# Patient Record
Sex: MALE | HISPANIC OR LATINO | Employment: PART TIME | URBAN - METROPOLITAN AREA
[De-identification: names, ages, dates, MRNs, and addresses within clinical notes are randomized per-mention and may not be internally consistent; named-entity substitution may affect disease eponyms.]

---

## 2021-12-24 ENCOUNTER — HOSPITAL ENCOUNTER (EMERGENCY)
Facility: MEDICAL CENTER | Age: 26
End: 2021-12-24
Attending: EMERGENCY MEDICINE
Payer: MEDICAID

## 2021-12-24 VITALS
WEIGHT: 130 LBS | RESPIRATION RATE: 12 BRPM | HEIGHT: 68 IN | DIASTOLIC BLOOD PRESSURE: 78 MMHG | TEMPERATURE: 97.5 F | OXYGEN SATURATION: 98 % | BODY MASS INDEX: 19.7 KG/M2 | HEART RATE: 96 BPM | SYSTOLIC BLOOD PRESSURE: 121 MMHG

## 2021-12-24 DIAGNOSIS — T50.901A ACCIDENTAL DRUG OVERDOSE, INITIAL ENCOUNTER: ICD-10-CM

## 2021-12-24 PROCEDURE — 99285 EMERGENCY DEPT VISIT HI MDM: CPT

## 2021-12-24 PROCEDURE — A9270 NON-COVERED ITEM OR SERVICE: HCPCS | Performed by: EMERGENCY MEDICINE

## 2021-12-24 PROCEDURE — 700102 HCHG RX REV CODE 250 W/ 637 OVERRIDE(OP): Performed by: EMERGENCY MEDICINE

## 2021-12-24 PROCEDURE — 700111 HCHG RX REV CODE 636 W/ 250 OVERRIDE (IP): Performed by: EMERGENCY MEDICINE

## 2021-12-24 RX ORDER — ONDANSETRON 4 MG/1
4 TABLET, ORALLY DISINTEGRATING ORAL EVERY 6 HOURS PRN
Qty: 10 TABLET | Refills: 0 | Status: SHIPPED | OUTPATIENT
Start: 2021-12-24 | End: 2021-12-29

## 2021-12-24 RX ORDER — METOCLOPRAMIDE 10 MG/1
10 TABLET ORAL ONCE
Status: COMPLETED | OUTPATIENT
Start: 2021-12-24 | End: 2021-12-24

## 2021-12-24 RX ORDER — ONDANSETRON 4 MG/1
4 TABLET, ORALLY DISINTEGRATING ORAL ONCE
Status: COMPLETED | OUTPATIENT
Start: 2021-12-24 | End: 2021-12-24

## 2021-12-24 RX ADMIN — ONDANSETRON 4 MG: 4 TABLET, ORALLY DISINTEGRATING ORAL at 19:49

## 2021-12-24 RX ADMIN — METOCLOPRAMIDE 10 MG: 10 TABLET ORAL at 20:18

## 2021-12-25 NOTE — ED PROVIDER NOTES
"ER Provider Note     Scribed for Jas Mantilla M.D. by Julissa Ramirez. 12/24/2021, 6:25 PM.    Primary Care Provider: Kenan Blake D.O.  Means of Arrival: Ambulance    History obtained from: Patient  History limited by: None     CHIEF COMPLAINT  Chief Complaint   Patient presents with    Drug Overdose       HPI  Corbin Jamil is a 26 y.o. male who presents to the Emergency Department via EMS for drug overdose. The patient notes that he took a friend's percocet \"to feel good\" because he has been feeling an increased amount of stress lately. He endorses that this is the second time he has taken drugs. Corbin was found unresponsive by his family. They conducted CPR for 5-7 minutes until EMS arrived. En route to the hospital, EMS administered 1 mg of narcan twice.     REVIEW OF SYSTEMS  See HPI for further details.   Pertinent positives include drug overdose. Pertinent negatives include no fever. All other systems negative.       PAST MEDICAL HISTORY  None.     SURGICAL HISTORY  patient denies any surgical history    SOCIAL HISTORY  Social History     Tobacco Use    Smoking status: Never Smoker    Smokeless tobacco: None noted   Substance Use Topics    Alcohol use: No    Drug use: No      Social History     Substance and Sexual Activity   Drug Use No       FAMILY HISTORY  Family History   Problem Relation Age of Onset    Heart Disease Maternal Grandfather     Diabetes Neg Hx     Stroke Neg Hx     Hypertension Neg Hx     Cancer Neg Hx        CURRENT MEDICATIONS    Current Outpatient Medications:     hydrocodone-acetaminophen (NORCO) 5-325 MG TABS per tablet, Take 1-2 Tabs by mouth every four hours as needed., Disp: 10 Tab, Rfl: 0    ALLERGIES  No Known Allergies    PHYSICAL EXAM  VITAL SIGNS: /74   Pulse 92   Temp 36.7 °C (98 °F) (Temporal)   Resp 12   Ht 1.727 m (5' 8\")   Wt 59 kg (130 lb)   SpO2 99%   BMI 19.77 kg/m²      Constitutional: Alert in no apparent distress.  HENT: No signs of trauma, " Bilateral external ears normal, Nose normal.   Eyes: Pupils are equal and reactive, Conjunctiva normal, Non-icteric.   Neck: Normal range of motion, No tenderness, Supple, No stridor.   Lymphatic: No lymphadenopathy noted.   Cardiovascular: Regular rate and rhythm, no murmurs.   Thorax & Lungs: Normal breath sounds, No respiratory distress, No wheezing, No chest tenderness.   Abdomen: Bowel sounds normal, Soft, No tenderness, No masses, No pulsatile masses. No peritoneal signs.  Skin: Warm, Dry, No erythema, No rash.   Back: No bony tenderness, No CVA tenderness.   Extremities: Intact distal pulses, No edema, No tenderness, No cyanosis.  Musculoskeletal: Good range of motion in all major joints. No tenderness to palpation or major deformities noted.   Neurologic: Alert , Normal motor function, Normal sensory function, No focal deficits noted.   Psychiatric: Affect normal, Judgment normal, Mood normal.         DIAGNOSTIC STUDIES / PROCEDURES    EKG Interpretation:  Interpreted by me  12 Lead EKG interpreted by me to show:  Normal sinus rhythm  Rate 91  Axis: Normal  Intervals: Normal  Normal T waves. No T wave inversion.   No ST depression. ST elevation which appears to be benign early repolarization.   My impression of this EKG: Does not indicate ischemia or arrhythmia at this time.       COURSE & MEDICAL DECISION MAKING  Pertinent Labs & Imaging studies reviewed. (See chart for details)    This is a 26 y.o. male that presents after a overdose on likely fentanyl that was told to be Percocet.  Patient was given Narcan and woke up.  The patient had CPR performed prior to this.  At this time the patient is nauseous and will give Zofran.  We will observe the patient for over 1 hour to assure the half-life of Narcan is longer than the underlying drug.  We did discuss drug use and I counseled him..     6:25 PM - Patient seen and examined at bedside. I spoke to the patient about the dangers of taking pills and using drugs.  I advised him to discontinue.     7:48 PM - Ordered Zofran 4 mg.        The patient will return for new or worsening symptoms and is stable at the time of discharge.    The patient is referred to a primary physician for blood pressure management, diabetic screening, and for all other preventative health concerns.    The patient has an unremarkable EKG.  Other labs are not indicated at this time.  He is feeling improved and continues to be breathing normally with no recurrence of opiate toxicity.  Zofran helps his nausea and will discharge him home with this.  He was given psychiatric resources as well.      DISPOSITION:  Patient will be discharged home in stable condition.    FOLLOW UP:  Kenan Blake D.O.  [address] [facility] [address]  [phone]        OUTPATIENT MEDICATIONS:  Discharge Medication List as of 12/24/2021  9:05 PM        START taking these medications    Details   ondansetron (ZOFRAN ODT) 4 MG TABLET DISPERSIBLE Take 1 Tablet by mouth every 6 hours as needed for Nausea for up to 5 days., Disp-10 Tablet, R-0, Normal               FINAL IMPRESSION  1. Accidental drug overdose, initial encounter          Julissa URENA (Kandy), am scribing for, and in the presence of, Jsa Mantilla M.D..    Electronically signed by: Julissa Ramirez (Kandy), 12/24/2021    Jas URENA M.D. personally performed the services described in this documentation, as scribed by Julissa Ramirez in my presence, and it is both accurate and complete.     The note accurately reflects work and decisions made by me.  Jas Mantilla M.D.  12/24/2021  11:07 PM

## 2021-12-25 NOTE — ED NOTES
Patient placed back on 2L of oxygen NC, when patient is sleeping, pt desat to 86% on room air. Patient is now >90% on 2L NC.

## 2021-12-25 NOTE — ED NOTES
"Pt discharged home with his sister. Pt is A/O x 4, ambulatory with a steady gait. Pt awake and talking and tolerating room air. IV discontinued and gauze placed, pt in possession of belongings. Pt provided discharge education and information pertaining to medications and follow up appointments. Discussed signs and symptoms to return to the ER, patient verbalized understanding. Pt received copy of discharge instructions and verbalized understanding.     /78   Pulse 96   Temp 36.4 °C (97.5 °F) (Temporal)   Resp 12   Ht 1.727 m (5' 8\")   Wt 59 kg (130 lb)   SpO2 98%   BMI 19.77 kg/m²    "

## 2021-12-25 NOTE — ED TRIAGE NOTES
"Vitals:    12/24/21 1814   BP: 119/74   Pulse: 92   Resp: 12   Temp: 36.7 °C (98 °F)   SpO2: 99%     Chief Complaint   Patient presents with   • Drug Overdose     Pt was found by his family unresponsive. They did 5-7 mnutes of CPR until EMS arrived. Pt was immediately given narcan IM, and IV was established and another 1 mg narcan was given intravenously. Pt woke and stated that he had taken a friends percocet \"to feel good.\" Pt denies suicide attempt again here in the ED. Pt was initially in Afib RVR on scene but with fluids and oxygen en route has returned to a NSR. Pt is arousable but sleepy at this time, and is alert and oriented x 4. Pt has apparently been seen at [facility] in the past, takes prozac and zyprexa.   "

## 2025-05-23 ENCOUNTER — ANESTHESIA (OUTPATIENT)
Dept: SURGERY | Facility: MEDICAL CENTER | Age: 30
End: 2025-05-23
Payer: COMMERCIAL

## 2025-05-23 ENCOUNTER — APPOINTMENT (OUTPATIENT)
Dept: RADIOLOGY | Facility: MEDICAL CENTER | Age: 30
End: 2025-05-23
Attending: EMERGENCY MEDICINE
Payer: COMMERCIAL

## 2025-05-23 ENCOUNTER — HOSPITAL ENCOUNTER (EMERGENCY)
Facility: MEDICAL CENTER | Age: 30
End: 2025-05-23
Attending: EMERGENCY MEDICINE
Payer: COMMERCIAL

## 2025-05-23 ENCOUNTER — ANESTHESIA EVENT (OUTPATIENT)
Dept: SURGERY | Facility: MEDICAL CENTER | Age: 30
End: 2025-05-23
Payer: COMMERCIAL

## 2025-05-23 VITALS
TEMPERATURE: 98.8 F | BODY MASS INDEX: 23.67 KG/M2 | HEART RATE: 78 BPM | RESPIRATION RATE: 16 BRPM | WEIGHT: 150.79 LBS | HEIGHT: 67 IN | OXYGEN SATURATION: 97 % | SYSTOLIC BLOOD PRESSURE: 109 MMHG | DIASTOLIC BLOOD PRESSURE: 72 MMHG

## 2025-05-23 DIAGNOSIS — K35.80 ACUTE APPENDICITIS, UNSPECIFIED ACUTE APPENDICITIS TYPE: ICD-10-CM

## 2025-05-23 DIAGNOSIS — K35.30 ACUTE APPENDICITIS WITH LOCALIZED PERITONITIS, UNSPECIFIED WHETHER ABSCESS PRESENT, UNSPECIFIED WHETHER GANGRENE PRESENT, UNSPECIFIED WHETHER PERFORATION PRESENT: Primary | ICD-10-CM

## 2025-05-23 LAB
ALBUMIN SERPL BCP-MCNC: 4.8 G/DL (ref 3.2–4.9)
ALBUMIN/GLOB SERPL: 1.8 G/DL
ALP SERPL-CCNC: 132 U/L (ref 30–99)
ALT SERPL-CCNC: 98 U/L (ref 2–50)
ANION GAP SERPL CALC-SCNC: 15 MMOL/L (ref 7–16)
AST SERPL-CCNC: 43 U/L (ref 12–45)
BASOPHILS # BLD AUTO: 0.2 % (ref 0–1.8)
BASOPHILS # BLD: 0.03 K/UL (ref 0–0.12)
BILIRUB SERPL-MCNC: 0.8 MG/DL (ref 0.1–1.5)
BUN SERPL-MCNC: 19 MG/DL (ref 8–22)
CALCIUM ALBUM COR SERPL-MCNC: 9 MG/DL (ref 8.5–10.5)
CALCIUM SERPL-MCNC: 9.6 MG/DL (ref 8.5–10.5)
CHLORIDE SERPL-SCNC: 103 MMOL/L (ref 96–112)
CO2 SERPL-SCNC: 21 MMOL/L (ref 20–33)
CREAT SERPL-MCNC: 0.88 MG/DL (ref 0.5–1.4)
EOSINOPHIL # BLD AUTO: 0 K/UL (ref 0–0.51)
EOSINOPHIL NFR BLD: 0 % (ref 0–6.9)
ERYTHROCYTE [DISTWIDTH] IN BLOOD BY AUTOMATED COUNT: 36.9 FL (ref 35.9–50)
GFR SERPLBLD CREATININE-BSD FMLA CKD-EPI: 119 ML/MIN/1.73 M 2
GLOBULIN SER CALC-MCNC: 2.7 G/DL (ref 1.9–3.5)
GLUCOSE SERPL-MCNC: 116 MG/DL (ref 65–99)
HCT VFR BLD AUTO: 48.9 % (ref 42–52)
HGB BLD-MCNC: 17.3 G/DL (ref 14–18)
IMM GRANULOCYTES # BLD AUTO: 0.11 K/UL (ref 0–0.11)
IMM GRANULOCYTES NFR BLD AUTO: 0.6 % (ref 0–0.9)
LIPASE SERPL-CCNC: 15 U/L (ref 11–82)
LYMPHOCYTES # BLD AUTO: 0.56 K/UL (ref 1–4.8)
LYMPHOCYTES NFR BLD: 3.1 % (ref 22–41)
MCH RBC QN AUTO: 31.1 PG (ref 27–33)
MCHC RBC AUTO-ENTMCNC: 35.4 G/DL (ref 32.3–36.5)
MCV RBC AUTO: 87.8 FL (ref 81.4–97.8)
MONOCYTES # BLD AUTO: 1.3 K/UL (ref 0–0.85)
MONOCYTES NFR BLD AUTO: 7.1 % (ref 0–13.4)
NEUTROPHILS # BLD AUTO: 16.3 K/UL (ref 1.82–7.42)
NEUTROPHILS NFR BLD: 89 % (ref 44–72)
NRBC # BLD AUTO: 0 K/UL
NRBC BLD-RTO: 0 /100 WBC (ref 0–0.2)
PATHOLOGY CONSULT NOTE: NORMAL
PLATELET # BLD AUTO: 143 K/UL (ref 164–446)
PMV BLD AUTO: 11.4 FL (ref 9–12.9)
POTASSIUM SERPL-SCNC: 3.9 MMOL/L (ref 3.6–5.5)
PROT SERPL-MCNC: 7.5 G/DL (ref 6–8.2)
RBC # BLD AUTO: 5.57 M/UL (ref 4.7–6.1)
SODIUM SERPL-SCNC: 139 MMOL/L (ref 135–145)
WBC # BLD AUTO: 18.3 K/UL (ref 4.8–10.8)

## 2025-05-23 PROCEDURE — 700105 HCHG RX REV CODE 258: Performed by: EMERGENCY MEDICINE

## 2025-05-23 PROCEDURE — 88304 TISSUE EXAM BY PATHOLOGIST: CPT | Mod: 26 | Performed by: PATHOLOGY

## 2025-05-23 PROCEDURE — 85025 COMPLETE CBC W/AUTO DIFF WBC: CPT

## 2025-05-23 PROCEDURE — 160048 HCHG OR STATISTICAL LEVEL 1-5: Performed by: SURGERY

## 2025-05-23 PROCEDURE — 74177 CT ABD & PELVIS W/CONTRAST: CPT

## 2025-05-23 PROCEDURE — 160046 HCHG PACU - 1ST 60 MINS PHASE II: Performed by: SURGERY

## 2025-05-23 PROCEDURE — 160029 HCHG SURGERY MINUTES - 1ST 30 MINS LEVEL 4: Performed by: SURGERY

## 2025-05-23 PROCEDURE — 700101 HCHG RX REV CODE 250: Performed by: SURGERY

## 2025-05-23 PROCEDURE — 99291 CRITICAL CARE FIRST HOUR: CPT

## 2025-05-23 PROCEDURE — 700111 HCHG RX REV CODE 636 W/ 250 OVERRIDE (IP): Performed by: ANESTHESIOLOGY

## 2025-05-23 PROCEDURE — 160025 RECOVERY II MINUTES (STATS): Performed by: SURGERY

## 2025-05-23 PROCEDURE — 160015 HCHG STAT PREOP MINUTES: Performed by: SURGERY

## 2025-05-23 PROCEDURE — 160041 HCHG SURGERY MINUTES - EA ADDL 1 MIN LEVEL 4: Performed by: SURGERY

## 2025-05-23 PROCEDURE — 700117 HCHG RX CONTRAST REV CODE 255: Performed by: EMERGENCY MEDICINE

## 2025-05-23 PROCEDURE — 83690 ASSAY OF LIPASE: CPT

## 2025-05-23 PROCEDURE — 700111 HCHG RX REV CODE 636 W/ 250 OVERRIDE (IP): Performed by: EMERGENCY MEDICINE

## 2025-05-23 PROCEDURE — 80053 COMPREHEN METABOLIC PANEL: CPT

## 2025-05-23 PROCEDURE — 96365 THER/PROPH/DIAG IV INF INIT: CPT | Mod: XU

## 2025-05-23 PROCEDURE — 700105 HCHG RX REV CODE 258: Performed by: SURGERY

## 2025-05-23 PROCEDURE — 96375 TX/PRO/DX INJ NEW DRUG ADDON: CPT | Mod: XU

## 2025-05-23 PROCEDURE — 700101 HCHG RX REV CODE 250: Performed by: ANESTHESIOLOGY

## 2025-05-23 PROCEDURE — 160035 HCHG PACU - 1ST 60 MINS PHASE I: Performed by: SURGERY

## 2025-05-23 PROCEDURE — 88304 TISSUE EXAM BY PATHOLOGIST: CPT | Performed by: PATHOLOGY

## 2025-05-23 PROCEDURE — 36415 COLL VENOUS BLD VENIPUNCTURE: CPT

## 2025-05-23 PROCEDURE — 160002 HCHG RECOVERY MINUTES (STAT): Performed by: SURGERY

## 2025-05-23 PROCEDURE — 160009 HCHG ANES TIME/MIN: Performed by: SURGERY

## 2025-05-23 RX ORDER — DEXAMETHASONE SODIUM PHOSPHATE 4 MG/ML
INJECTION, SOLUTION INTRA-ARTICULAR; INTRALESIONAL; INTRAMUSCULAR; INTRAVENOUS; SOFT TISSUE PRN
Status: DISCONTINUED | OUTPATIENT
Start: 2025-05-23 | End: 2025-05-23 | Stop reason: SURG

## 2025-05-23 RX ORDER — LABETALOL HYDROCHLORIDE 5 MG/ML
5 INJECTION, SOLUTION INTRAVENOUS
Status: DISCONTINUED | OUTPATIENT
Start: 2025-05-23 | End: 2025-05-23 | Stop reason: HOSPADM

## 2025-05-23 RX ORDER — MORPHINE SULFATE 4 MG/ML
4 INJECTION INTRAVENOUS ONCE
Status: COMPLETED | OUTPATIENT
Start: 2025-05-23 | End: 2025-05-23

## 2025-05-23 RX ORDER — ALBUTEROL SULFATE 5 MG/ML
2.5 SOLUTION RESPIRATORY (INHALATION)
Status: DISCONTINUED | OUTPATIENT
Start: 2025-05-23 | End: 2025-05-23 | Stop reason: HOSPADM

## 2025-05-23 RX ORDER — SODIUM CHLORIDE, SODIUM LACTATE, POTASSIUM CHLORIDE, CALCIUM CHLORIDE 600; 310; 30; 20 MG/100ML; MG/100ML; MG/100ML; MG/100ML
INJECTION, SOLUTION INTRAVENOUS CONTINUOUS
Status: DISCONTINUED | OUTPATIENT
Start: 2025-05-23 | End: 2025-05-23 | Stop reason: HOSPADM

## 2025-05-23 RX ORDER — SODIUM CHLORIDE 9 MG/ML
1000 INJECTION, SOLUTION INTRAVENOUS ONCE
Status: COMPLETED | OUTPATIENT
Start: 2025-05-23 | End: 2025-05-23

## 2025-05-23 RX ORDER — ONDANSETRON 2 MG/ML
INJECTION INTRAMUSCULAR; INTRAVENOUS PRN
Status: DISCONTINUED | OUTPATIENT
Start: 2025-05-23 | End: 2025-05-23 | Stop reason: SURG

## 2025-05-23 RX ORDER — ONDANSETRON 2 MG/ML
4 INJECTION INTRAMUSCULAR; INTRAVENOUS ONCE
Status: COMPLETED | OUTPATIENT
Start: 2025-05-23 | End: 2025-05-23

## 2025-05-23 RX ORDER — BUPIVACAINE HYDROCHLORIDE AND EPINEPHRINE 5; 5 MG/ML; UG/ML
INJECTION, SOLUTION PERINEURAL
Status: DISCONTINUED | OUTPATIENT
Start: 2025-05-23 | End: 2025-05-23 | Stop reason: HOSPADM

## 2025-05-23 RX ORDER — HYDROMORPHONE HYDROCHLORIDE 1 MG/ML
0.1 INJECTION, SOLUTION INTRAMUSCULAR; INTRAVENOUS; SUBCUTANEOUS
Status: DISCONTINUED | OUTPATIENT
Start: 2025-05-23 | End: 2025-05-23 | Stop reason: HOSPADM

## 2025-05-23 RX ORDER — HYDROMORPHONE HYDROCHLORIDE 1 MG/ML
0.4 INJECTION, SOLUTION INTRAMUSCULAR; INTRAVENOUS; SUBCUTANEOUS
Status: DISCONTINUED | OUTPATIENT
Start: 2025-05-23 | End: 2025-05-23 | Stop reason: HOSPADM

## 2025-05-23 RX ORDER — OXYCODONE AND ACETAMINOPHEN 10; 325 MG/1; MG/1
1 TABLET ORAL EVERY 4 HOURS PRN
Qty: 15 TABLET | Refills: 0 | Status: SHIPPED | OUTPATIENT
Start: 2025-05-23 | End: 2025-05-30

## 2025-05-23 RX ORDER — OXYCODONE HCL 5 MG/5 ML
10 SOLUTION, ORAL ORAL
Status: DISCONTINUED | OUTPATIENT
Start: 2025-05-23 | End: 2025-05-23 | Stop reason: HOSPADM

## 2025-05-23 RX ORDER — HYDRALAZINE HYDROCHLORIDE 20 MG/ML
5 INJECTION INTRAMUSCULAR; INTRAVENOUS
Status: DISCONTINUED | OUTPATIENT
Start: 2025-05-23 | End: 2025-05-23 | Stop reason: HOSPADM

## 2025-05-23 RX ORDER — ONDANSETRON 2 MG/ML
4 INJECTION INTRAMUSCULAR; INTRAVENOUS
Status: DISCONTINUED | OUTPATIENT
Start: 2025-05-23 | End: 2025-05-23 | Stop reason: HOSPADM

## 2025-05-23 RX ORDER — HYDROMORPHONE HYDROCHLORIDE 1 MG/ML
0.2 INJECTION, SOLUTION INTRAMUSCULAR; INTRAVENOUS; SUBCUTANEOUS
Status: DISCONTINUED | OUTPATIENT
Start: 2025-05-23 | End: 2025-05-23 | Stop reason: HOSPADM

## 2025-05-23 RX ORDER — HALOPERIDOL 5 MG/ML
1 INJECTION INTRAMUSCULAR
Status: DISCONTINUED | OUTPATIENT
Start: 2025-05-23 | End: 2025-05-23 | Stop reason: HOSPADM

## 2025-05-23 RX ORDER — KETOROLAC TROMETHAMINE 15 MG/ML
INJECTION, SOLUTION INTRAMUSCULAR; INTRAVENOUS PRN
Status: DISCONTINUED | OUTPATIENT
Start: 2025-05-23 | End: 2025-05-23 | Stop reason: SURG

## 2025-05-23 RX ORDER — SUCCINYLCHOLINE CHLORIDE 20 MG/ML
INJECTION INTRAMUSCULAR; INTRAVENOUS PRN
Status: DISCONTINUED | OUTPATIENT
Start: 2025-05-23 | End: 2025-05-23 | Stop reason: SURG

## 2025-05-23 RX ORDER — CEFTRIAXONE 2 G/1
2000 INJECTION, POWDER, FOR SOLUTION INTRAMUSCULAR; INTRAVENOUS ONCE
Status: COMPLETED | OUTPATIENT
Start: 2025-05-23 | End: 2025-05-23

## 2025-05-23 RX ORDER — ROCURONIUM BROMIDE 10 MG/ML
INJECTION, SOLUTION INTRAVENOUS PRN
Status: DISCONTINUED | OUTPATIENT
Start: 2025-05-23 | End: 2025-05-23 | Stop reason: SURG

## 2025-05-23 RX ORDER — MEPERIDINE HYDROCHLORIDE 25 MG/ML
6.25 INJECTION INTRAMUSCULAR; INTRAVENOUS; SUBCUTANEOUS
Status: DISCONTINUED | OUTPATIENT
Start: 2025-05-23 | End: 2025-05-23 | Stop reason: HOSPADM

## 2025-05-23 RX ORDER — METOCLOPRAMIDE HYDROCHLORIDE 5 MG/ML
INJECTION INTRAMUSCULAR; INTRAVENOUS PRN
Status: DISCONTINUED | OUTPATIENT
Start: 2025-05-23 | End: 2025-05-23 | Stop reason: SURG

## 2025-05-23 RX ORDER — EPHEDRINE SULFATE 50 MG/ML
5 INJECTION, SOLUTION INTRAVENOUS
Status: DISCONTINUED | OUTPATIENT
Start: 2025-05-23 | End: 2025-05-23 | Stop reason: HOSPADM

## 2025-05-23 RX ORDER — METRONIDAZOLE 500 MG/100ML
500 INJECTION, SOLUTION INTRAVENOUS ONCE
Status: COMPLETED | OUTPATIENT
Start: 2025-05-23 | End: 2025-05-23

## 2025-05-23 RX ORDER — DIPHENHYDRAMINE HYDROCHLORIDE 50 MG/ML
12.5 INJECTION, SOLUTION INTRAMUSCULAR; INTRAVENOUS
Status: DISCONTINUED | OUTPATIENT
Start: 2025-05-23 | End: 2025-05-23 | Stop reason: HOSPADM

## 2025-05-23 RX ORDER — LIDOCAINE HYDROCHLORIDE 20 MG/ML
INJECTION, SOLUTION EPIDURAL; INFILTRATION; INTRACAUDAL; PERINEURAL PRN
Status: DISCONTINUED | OUTPATIENT
Start: 2025-05-23 | End: 2025-05-23 | Stop reason: SURG

## 2025-05-23 RX ORDER — OXYCODONE HCL 5 MG/5 ML
5 SOLUTION, ORAL ORAL
Status: DISCONTINUED | OUTPATIENT
Start: 2025-05-23 | End: 2025-05-23 | Stop reason: HOSPADM

## 2025-05-23 RX ADMIN — ONDANSETRON 4 MG: 2 INJECTION INTRAMUSCULAR; INTRAVENOUS at 16:40

## 2025-05-23 RX ADMIN — LIDOCAINE HYDROCHLORIDE 100 MG: 20 INJECTION, SOLUTION EPIDURAL; INFILTRATION; INTRACAUDAL; PERINEURAL at 16:23

## 2025-05-23 RX ADMIN — CEFTRIAXONE SODIUM 2000 MG: 2 INJECTION, POWDER, FOR SOLUTION INTRAMUSCULAR; INTRAVENOUS at 11:53

## 2025-05-23 RX ADMIN — KETOROLAC TROMETHAMINE 15 MG: 15 INJECTION, SOLUTION INTRAMUSCULAR; INTRAVENOUS at 16:40

## 2025-05-23 RX ADMIN — MORPHINE SULFATE 4 MG: 4 INJECTION INTRAVENOUS at 11:53

## 2025-05-23 RX ADMIN — FENTANYL CITRATE 50 MCG: 50 INJECTION, SOLUTION INTRAMUSCULAR; INTRAVENOUS at 16:22

## 2025-05-23 RX ADMIN — ROCURONIUM BROMIDE 30 MG: 10 INJECTION INTRAVENOUS at 16:27

## 2025-05-23 RX ADMIN — PROPOFOL 200 MG: 10 INJECTION, EMULSION INTRAVENOUS at 16:23

## 2025-05-23 RX ADMIN — SODIUM CHLORIDE, POTASSIUM CHLORIDE, SODIUM LACTATE AND CALCIUM CHLORIDE: 600; 310; 30; 20 INJECTION, SOLUTION INTRAVENOUS at 16:01

## 2025-05-23 RX ADMIN — SUCCINYLCHOLINE CHLORIDE 80 MG: 20 INJECTION, SOLUTION INTRAMUSCULAR; INTRAVENOUS at 16:23

## 2025-05-23 RX ADMIN — METRONIDAZOLE 500 MG: 5 INJECTION, SOLUTION INTRAVENOUS at 11:53

## 2025-05-23 RX ADMIN — SUGAMMADEX 200 MG: 100 INJECTION, SOLUTION INTRAVENOUS at 16:47

## 2025-05-23 RX ADMIN — IOHEXOL 100 ML: 350 INJECTION, SOLUTION INTRAVENOUS at 11:45

## 2025-05-23 RX ADMIN — METOCLOPRAMIDE HYDROCHLORIDE 10 MG: 5 INJECTION INTRAMUSCULAR; INTRAVENOUS at 16:23

## 2025-05-23 RX ADMIN — ONDANSETRON 4 MG: 2 INJECTION INTRAMUSCULAR; INTRAVENOUS at 10:57

## 2025-05-23 RX ADMIN — FENTANYL CITRATE 50 MCG: 50 INJECTION, SOLUTION INTRAMUSCULAR; INTRAVENOUS at 16:34

## 2025-05-23 RX ADMIN — DEXAMETHASONE SODIUM PHOSPHATE 8 MG: 4 INJECTION INTRA-ARTICULAR; INTRALESIONAL; INTRAMUSCULAR; INTRAVENOUS; SOFT TISSUE at 16:23

## 2025-05-23 RX ADMIN — SODIUM CHLORIDE 1000 ML: 9 INJECTION, SOLUTION INTRAVENOUS at 10:57

## 2025-05-23 ASSESSMENT — PAIN DESCRIPTION - PAIN TYPE
TYPE: SURGICAL PAIN
TYPE: ACUTE PAIN

## 2025-05-23 NOTE — OR SURGEON
Immediate Post OP Note    PreOp Diagnosis: acute appendicitis      PostOp Diagnosis: same      Procedure(s):  APPENDECTOMY, LAPAROSCOPIC    Surgeon(s):  Nahun Nice M.D.    Anesthesiologist/Type of Anesthesia:  Anesthesiologist: Krystyna Fine M.D./* No anesthesia type entered *    Surgical Staff:  Circulator: Gianna Isaac R.N.  Scrub Person: Claudy Boone    Specimens removed if any:  ID Type Source Tests Collected by Time Destination   A :  Tissue Appendix PATHOLOGY SPECIMEN Nahun Nice M.D. 5/23/2025  4:44 PM        Estimated Blood Loss:       Findings:       Complications:           5/23/2025 4:49 PM Nahun Nice M.D.

## 2025-05-23 NOTE — ED TRIAGE NOTES
"Chief Complaint   Patient presents with    N/V     Onset early am today with H/A. Reports his vomit is rather liquidy.     Headache    Abdominal Pain     Mid lower quadrant.      Blood Pressure: 124/80, Pulse: 84, Respiration: 14, Temperature: 37.1 °C (98.7 °F), Height: 170.2 cm (5' 7\"), Weight: 68.4 kg (150 lb 12.7 oz), BMI (Calculated): 23.62, BSA (Calculated): 1.8, Pulse Oximetry: 98 %, O2 Delivery Device: None - Room Air  " Detail Level: Detailed Detail Level: Zone

## 2025-05-23 NOTE — ANESTHESIA PREPROCEDURE EVALUATION
Case: [id number] Date/Time: 05/23/25 1537    Procedure: APPENDECTOMY, LAPAROSCOPIC    Location: [facility] OR 03 / SURGERY [facility]    Surgeons: Nahun Nice M.D.            Relevant Problems   ANESTHESIA (within normal limits)      PULMONARY (within normal limits)      NEURO (within normal limits)      CARDIAC (within normal limits)      GI (within normal limits)       (within normal limits)      ENDO (within normal limits)      OB (within normal limits)       Physical Exam    Airway   Mallampati: II  TM distance: >3 FB  Neck ROM: full       Cardiovascular - normal exam  Rhythm: regular  Rate: normal    (-) murmur     Dental - normal exam           Pulmonary - normal examBreath sounds clear to auscultation     Abdominal    Neurological - normal exam                   Anesthesia Plan    ASA 1       Plan - general       Airway plan will be ETT          Induction: intravenous    Postoperative Plan: Postoperative administration of opioids is intended.    Pertinent diagnostic labs and testing reviewed    Informed Consent:    Anesthetic plan and risks discussed with patient.    Use of blood products discussed with: patient whom consented to blood products.

## 2025-05-23 NOTE — DISCHARGE PLANNING
Met with pt who reports he is independent with ADLs and IADLs. He does not have a PCP and gave consent to CM to look for one. CM talked to Michael at scheduling center and he will find pt a PCP and make an appt in 2 weeks.     Pharmacy is [facility] at [facility].     Care Transition Team Assessment    Information Source  Orientation Level: Oriented X4  Information Given By: Patient  Who is responsible for making decisions for patient? : Patient    Readmission Evaluation  Is this a readmission?: No    Elopement Risk  Legal Hold: No    Interdisciplinary Discharge Planning  Does Admitting Nurse Feel This Could be a Complex Discharge?: No  Primary Care Physician: no PCP  Lives with - Patient's Self Care Capacity: Parents  Patient or legal guardian wants to designate a caregiver: No  Support Systems: Parent  Do You Take your Prescribed Medications Regularly: Yes  Able to Return to Previous ADL's: Yes  Mobility Issues: No  Prior Services: Home-Independent  Patient Prefers to be Discharged to:: Home  Assistance Needed: No    Discharge Preparedness  What is your plan after discharge?: Home with help  What are your discharge supports?: Parent  Prior Functional Level: Ambulatory, Drives Self, Independent with Activities of Daily Living, Independent with Medication Management  Difficulity with ADLs: None  Difficulity with IADLs: None    Functional Assesment  Prior Functional Level: Ambulatory, Drives Self, Independent with Activities of Daily Living, Independent with Medication Management    Finances  Financial Barriers to Discharge: No  Prescription Coverage: Yes    Vision / Hearing Impairment  Vision Impairment : No  Hearing Impairment : No    Values / Beliefs / Concerns  Values / Beliefs Concerns : No    Advance Directive  Advance Directive?: None    Domestic Abuse  Have you ever been the victim of abuse or violence?: No    Psychological Assessment  History of Substance Abuse: None    Discharge Risks or Barriers  Discharge  risks or barriers?: No PCP  Patient risk factors: No PCP    Anticipated Discharge Information  Discharge Disposition: Discharged to home/self care (01)

## 2025-05-23 NOTE — ED PROVIDER NOTES
ED Provider Note    CHIEF COMPLAINT  Chief Complaint   Patient presents with    N/V     Onset early am today with H/A. Reports his vomit is rather liquidy.     Headache    Abdominal Pain     Mid lower quadrant.        EXTERNAL RECORDS REVIEWED  None    HPI/ROS  LIMITATION TO HISTORY   none  OUTSIDE HISTORIAN(S):  None    Corbin Jamil is a 29 y.o. male who presents to the emergency department complaining of vomiting and abdominal pain.  Patient woke up this morning began vomiting.  He has had multiple episodes of emesis.  There is nonbloody nonbilious emesis.  Its associate with diffuse moderate to severe crampy abdominal pain.  Has not moved his bowels, had diarrhea or pass gas.  Patient denies any fevers or chills.  Denies any other acute concerns or complaints.  He believes he might of had some bad food last night from [employer] chicken.  Denies any other acute concerns or complaints.  No foreign travel or sick contacts.    PAST MEDICAL HISTORY       SURGICAL HISTORY  patient denies any surgical history    FAMILY HISTORY  Family History   Problem Relation Age of Onset    Heart Disease Maternal Grandfather     Diabetes Neg Hx     Stroke Neg Hx     Hypertension Neg Hx     Cancer Neg Hx        SOCIAL HISTORY  Social History     Tobacco Use    Smoking status: Never    Smokeless tobacco: Never   Vaping Use    Vaping status: Never Used   Substance and Sexual Activity    Alcohol use: No    Drug use: No    Sexual activity: Not on file       CURRENT MEDICATIONS  Home Medications       Reviewed by Momo Srivastava R.N. (Registered Nurse) on 05/23/25 at 1038  Med List Status: Not Addressed     Medication Last Dose Status   hydrocodone-acetaminophen (NORCO) 5-325 MG TABS per tablet  Active                  Audit from Redirected Encounters    **Home medications have not yet been reviewed for this encounter**         ALLERGIES  Allergies[1]    PHYSICAL EXAM  VITAL SIGNS: /80   Pulse 84   Temp 37.1 °C (98.7 °F)  "(Temporal)   Resp 14   Ht 1.702 m (5' 7\")   Wt 68.4 kg (150 lb 12.7 oz)   SpO2 98%   BMI 23.62 kg/m²    Constitutional: Awake alert nontoxic no acute distress.  HENT: Normocephalic, Atraumatic,  Eyes: PERRL, EOMI, Conjunctiva normal, No discharge.   Neck: Normal range of motion, No tenderness, Supple, No stridor.   Cardiovascular: Normal heart rate, Normal rhythm, No murmurs, No rubs, No gallops.   Thorax & Lungs: Normal breath sounds, No respiratory distress, No wheezing  Abdomen: Soft, distended, with no tenderness in the right lower quadrant.  No peritonitis.  Skin: Warm, Dry, No erythema, No rash.   Back: No tenderness, No CVA tenderness.   Musculoskeletal: Good range of motion in all major joints.  Neurologic: Alert,No focal deficits noted.   Psychiatric: Affect normal      EKG/LABS  Results for orders placed or performed during the hospital encounter of 05/23/25   CBC WITH DIFFERENTIAL    Collection Time: 05/23/25 10:56 AM   Result Value Ref Range    WBC 18.3 (H) 4.8 - 10.8 K/uL    RBC 5.57 4.70 - 6.10 M/uL    Hemoglobin 17.3 14.0 - 18.0 g/dL    Hematocrit 48.9 42.0 - 52.0 %    MCV 87.8 81.4 - 97.8 fL    MCH 31.1 27.0 - 33.0 pg    MCHC 35.4 32.3 - 36.5 g/dL    RDW 36.9 35.9 - 50.0 fL    Platelet Count 143 (L) 164 - 446 K/uL    MPV 11.4 9.0 - 12.9 fL    Neutrophils-Polys 89.00 (H) 44.00 - 72.00 %    Lymphocytes 3.10 (L) 22.00 - 41.00 %    Monocytes 7.10 0.00 - 13.40 %    Eosinophils 0.00 0.00 - 6.90 %    Basophils 0.20 0.00 - 1.80 %    Immature Granulocytes 0.60 0.00 - 0.90 %    Nucleated RBC 0.00 0.00 - 0.20 /100 WBC    Neutrophils (Absolute) 16.30 (H) 1.82 - 7.42 K/uL    Lymphs (Absolute) 0.56 (L) 1.00 - 4.80 K/uL    Monos (Absolute) 1.30 (H) 0.00 - 0.85 K/uL    Eos (Absolute) 0.00 0.00 - 0.51 K/uL    Baso (Absolute) 0.03 0.00 - 0.12 K/uL    Immature Granulocytes (abs) 0.11 0.00 - 0.11 K/uL    NRBC (Absolute) 0.00 K/uL   COMP METABOLIC PANEL    Collection Time: 05/23/25 10:56 AM   Result Value Ref Range "    Sodium 139 135 - 145 mmol/L    Potassium 3.9 3.6 - 5.5 mmol/L    Chloride 103 96 - 112 mmol/L    Co2 21 20 - 33 mmol/L    Anion Gap 15.0 7.0 - 16.0    Glucose 116 (H) 65 - 99 mg/dL    Bun 19 8 - 22 mg/dL    Creatinine 0.88 0.50 - 1.40 mg/dL    Calcium 9.6 8.5 - 10.5 mg/dL    Correct Calcium 9.0 8.5 - 10.5 mg/dL    AST(SGOT) 43 12 - 45 U/L    ALT(SGPT) 98 (H) 2 - 50 U/L    Alkaline Phosphatase 132 (H) 30 - 99 U/L    Total Bilirubin 0.8 0.1 - 1.5 mg/dL    Albumin 4.8 3.2 - 4.9 g/dL    Total Protein 7.5 6.0 - 8.2 g/dL    Globulin 2.7 1.9 - 3.5 g/dL    A-G Ratio 1.8 g/dL   LIPASE    Collection Time: 05/23/25 10:56 AM   Result Value Ref Range    Lipase 15 11 - 82 U/L   ESTIMATED GFR    Collection Time: 05/23/25 10:56 AM   Result Value Ref Range    GFR (CKD-EPI) 119 >60 mL/min/1.73 m 2      I have independently interpreted this EKG    RADIOLOGY/PROCEDURES   I have independently interpreted the diagnostic imaging associated with this visit and am waiting the final reading from the radiologist.   My preliminary interpretation is as follows: Reviewed images agree with radiology results.    Radiologist interpretation:  CT-ABDOMEN-PELVIS WITH   Final Result      1.  Dilated appendix with wall thickening and surrounding inflammatory stranding consistent with acute appendicitis.      2.  Fatty liver.             COURSE & MEDICAL DECISION MAKING    ASSESSMENT, COURSE AND PLAN  Care Narrative:     9-year-old male presents emergency department with acute abdominal pain and vomiting.  Symptoms started earlier this morning.  He has no diarrhea.  Patient is diffuse abdominal tenderness.    Differential diagnose includes but is not limited to gastroenteritis, bowel obstruction, appendicitis, biliary disease, pancreatitis.  Patient is evaluated labs and imaging started IV fluids and IV Zofran.    Hydration: Based on the patient's presentation of Acute Vomiting and Inability to take oral fluids the patient was given IV fluids. IV  Hydration was used because oral hydration was not adequate alone. Upon recheck following hydration, the patient was improved.     CT shows acute appendicitis.  Have ordered antibiotics around 1145 and page the general surgeon.  Patient is aware of the plan.  Feels better after some morphine and Zofran.    ADDITIONAL PROBLEMS MANAGED  None    DISPOSITION AND DISCUSSIONS  I have discussed management of the patient with the following physicians and DEDRA's: Dr. Cervantes.  Care transferred the time.  He will see the patient ED.          FINAL DIAGNOSIS  1. Acute appendicitis with localized peritonitis, unspecified whether abscess present, unspecified whether gangrene present, unspecified whether perforation present         Electronically signed by: Luis Guzman M.D., 5/23/2025 10:49 AM           [1] No Known Allergies

## 2025-05-23 NOTE — ED NOTES
Pharmacy Medication Reconciliation      ~Medication reconciliation updated and complete per patient at bedside   ~Allergies have been verified   ~No oral ABX within the last 30 days  ~Is dispense history available in EPIC: no  ~Patient home pharmacy :  [facility]      ~Anticoagulants (rivaroxaban, apixaban, edoxaban, dabigatran, warfarin, enoxaparin) taken in the last 14 days? No

## 2025-05-23 NOTE — ED NOTES
Modest gauge PIV placed without difficulty. Blood to lab. Pt unable to provide urine sample at this time.     Pt transported to imaging.

## 2025-05-23 NOTE — PROGRESS NOTES
Surgical Progress Note    Author: Nahun Nice M.D. Date & Time created: 2025   3:17 PM     Interval Events:  Healthy male with abdominal pain and CT evidence for acute appendicitis  ROS  Hemodynamics:  Temp (24hrs), Av.1 °C (98.7 °F), Min:37.1 °C (98.7 °F), Max:37.1 °C (98.7 °F)  Temperature: 37.1 °C (98.7 °F)  Pulse  Av.7  Min: 84  Max: 86   Blood Pressure: 102/67     Respiratory:    Respiration: 15, Pulse Oximetry: 96 %           Neuro:  GCS       Fluids:  No intake or output data in the 24 hours ending 25 1517  Weight: 68.4 kg (150 lb 12.7 oz)  No Active Diet Orders    Physical Exam  Labs:  Recent Results (from the past 24 hours)   CBC WITH DIFFERENTIAL    Collection Time: 25 10:56 AM   Result Value Ref Range    WBC 18.3 (H) 4.8 - 10.8 K/uL    RBC 5.57 4.70 - 6.10 M/uL    Hemoglobin 17.3 14.0 - 18.0 g/dL    Hematocrit 48.9 42.0 - 52.0 %    MCV 87.8 81.4 - 97.8 fL    MCH 31.1 27.0 - 33.0 pg    MCHC 35.4 32.3 - 36.5 g/dL    RDW 36.9 35.9 - 50.0 fL    Platelet Count 143 (L) 164 - 446 K/uL    MPV 11.4 9.0 - 12.9 fL    Neutrophils-Polys 89.00 (H) 44.00 - 72.00 %    Lymphocytes 3.10 (L) 22.00 - 41.00 %    Monocytes 7.10 0.00 - 13.40 %    Eosinophils 0.00 0.00 - 6.90 %    Basophils 0.20 0.00 - 1.80 %    Immature Granulocytes 0.60 0.00 - 0.90 %    Nucleated RBC 0.00 0.00 - 0.20 /100 WBC    Neutrophils (Absolute) 16.30 (H) 1.82 - 7.42 K/uL    Lymphs (Absolute) 0.56 (L) 1.00 - 4.80 K/uL    Monos (Absolute) 1.30 (H) 0.00 - 0.85 K/uL    Eos (Absolute) 0.00 0.00 - 0.51 K/uL    Baso (Absolute) 0.03 0.00 - 0.12 K/uL    Immature Granulocytes (abs) 0.11 0.00 - 0.11 K/uL    NRBC (Absolute) 0.00 K/uL   COMP METABOLIC PANEL    Collection Time: 25 10:56 AM   Result Value Ref Range    Sodium 139 135 - 145 mmol/L    Potassium 3.9 3.6 - 5.5 mmol/L    Chloride 103 96 - 112 mmol/L    Co2 21 20 - 33 mmol/L    Anion Gap 15.0 7.0 - 16.0    Glucose 116 (H) 65 - 99 mg/dL    Bun 19 8 - 22 mg/dL     Creatinine 0.88 0.50 - 1.40 mg/dL    Calcium 9.6 8.5 - 10.5 mg/dL    Correct Calcium 9.0 8.5 - 10.5 mg/dL    AST(SGOT) 43 12 - 45 U/L    ALT(SGPT) 98 (H) 2 - 50 U/L    Alkaline Phosphatase 132 (H) 30 - 99 U/L    Total Bilirubin 0.8 0.1 - 1.5 mg/dL    Albumin 4.8 3.2 - 4.9 g/dL    Total Protein 7.5 6.0 - 8.2 g/dL    Globulin 2.7 1.9 - 3.5 g/dL    A-G Ratio 1.8 g/dL   LIPASE    Collection Time: 05/23/25 10:56 AM   Result Value Ref Range    Lipase 15 11 - 82 U/L   ESTIMATED GFR    Collection Time: 05/23/25 10:56 AM   Result Value Ref Range    GFR (CKD-EPI) 119 >60 mL/min/1.73 m 2     Medical Decision Making, by Problem:  There are no active hospital problems to display for this patient.    Plan:  Lap appy today--risks to be explained    Quality Measures:  Quality-Core Measures    Discussed patient condition with

## 2025-05-23 NOTE — ED NOTES
Pt ate something last night that didn't sit well and believes its food poison. N/V, can't keep anything down.

## 2025-05-23 NOTE — OR NURSING
1655: Pt arrived to PACU. Simple mask 6L in place. surgical dressing placed in OR, CDI. VSS.       1708:Called Janae young for updates      1706: Patient is tolerating sips    1727: Patient meets criteria for phase 2. Called report to ABRAM Rowley      1748: Pt transferred to phase 2

## 2025-05-23 NOTE — ANESTHESIA TIME REPORT
Anesthesia Start and Stop Event Times       Date Time Event    5/23/2025 1611 Ready for Procedure     1618 Anesthesia Start     1656 Anesthesia Stop          Responsible Staff  05/23/25      Name Role Begin End    Krystyna Fine M.D. Anesth 1618 1656          Overtime Reason:  no overtime (within assigned shift)    Comments:

## 2025-05-23 NOTE — CONSULTS
DATE OF SERVICE:  05/23/2025     SURGICAL CONSULTATION     TIME:  1540 hours.     CHIEF COMPLAINT:  Consult for appendicitis.     HISTORY OF PRESENT ILLNESS:  This is a 29-year-old otherwise healthy male, who   developed some abdominal pain earlier this a.m. and has evidence on CT scan   for acute appendicitis.  His white count was 13550.     PAST MEDICAL HISTORY:   OPERATIONS:  None.     MEDICAL DISEASES:  None.     MEDICATIONS:  None.     ALLERGIES:  None.     FAMILY HISTORY:  Noncontributory.     SOCIAL HISTORY:  The patient is a nonsmoker and unemployed.     REVIEW OF SYSTEMS:  I reviewed the 10-point AMA/CMS criteria from the chart.     PHYSICAL EXAMINATION:   VITAL SIGNS:  Temperature 98.8, blood pressure 112/72, pulse 80, BMI 24.   HEENT:  Eye glasses in place.   NECK:  Without masses.   CHEST:  Coarse breath sounds.   CARDIAC:  Auscultation unremarkable.   ABDOMEN:  Scaphoid.  Mildly tender in the right lower quadrant.   RECTAL AND GENITAL:  Deferred.   EXTREMITIES:  2+ pulses.     IMPRESSION:  Acute appendicitis.     PLAN:  The patient will undergo a laparoscopic appendectomy.     Risks of death, bleeding, infection, necessity for incision, postop infection   in spite of antibiotics have been carefully explained to the patient and   mother.  They understand and wish to proceed.     Thank you very much for this consultation.        ______________________________  MD CASSIUS RAMOS/LETTY    DD:  05/23/2025 15:50  DT:  05/23/2025 16:38    Job#:  [id number]

## 2025-05-23 NOTE — ANESTHESIA PROCEDURE NOTES
Airway    Date/Time: 5/23/2025 4:24 PM    Performed by: Krystyna Fine M.D.  Authorized by: Krystyna Fine M.D.    Location:  OR  Urgency:  Elective  Indications for Airway Management:  Anesthesia      Spontaneous Ventilation: absent    Sedation Level:  Deep  Preoxygenated: Yes    Patient Position:  Sniffing  Mask Difficulty Assessment:  0 - not attempted  Final Airway Type:  Endotracheal airway  Final Endotracheal Airway:  ETT  Cuffed: Yes    Technique Used for Successful ETT Placement:  Direct laryngoscopy    Insertion Site:  Oral  Blade Type:  Codey  Laryngoscope Blade/Videolaryngoscope Blade Size:  3  ETT Size (mm):  7.0  Measured from:  Teeth  ETT to Teeth (cm):  22  Placement Verified by: auscultation and capnometry    Cormack-Lehane Classification:  Grade IIa - partial view of glottis  Number of Attempts at Approach:  1

## 2025-05-24 NOTE — DISCHARGE INSTRUCTIONS
ACTIVITY: Rest and take it easy for the first 24 hours.  A responsible adult is recommended to remain with you during that time.  It is normal to feel sleepy.  We encourage you to not do anything that requires balance, judgment or coordination.    MILD FLU-LIKE SYMPTOMS ARE NORMAL. YOU MAY EXPERIENCE GENERALIZED MUSCLE ACHES, THROAT IRRITATION, HEADACHE AND/OR SOME NAUSEA.    FOR 24 HOURS DO NOT:  Drive, operate machinery or run household appliances.  Drink beer or alcoholic beverages.   Make important decisions or sign legal documents.    DIET: To avoid nausea, slowly advance diet as tolerated, avoiding spicy or greasy foods for the first day.  Add more substantial food to your diet according to your physician's instructions.    INCREASE FLUIDS AND FIBER TO AVOID CONSTIPATION.    FOLLOW-UP APPOINTMENT:  A follow-up appointment should be arranged with your doctor.    You should CALL YOUR PHYSICIAN if you develop:  Fever greater than 101 degrees F.  Pain not relieved by medication, or persistent nausea or vomiting.  Excessive bleeding (blood soaking through dressing) or unexpected drainage from the wound.  Extreme redness or swelling around the incision site, drainage of pus or foul smelling drainage.  Inability to urinate or empty your bladder within 8 hours.  Problems with breathing or chest pain.    You should call 911 if you develop problems with breathing or chest pain.  If you are unable to contact your doctor or surgical center, you should go to the nearest emergency room or urgent care center.  Physician's telephone #: [phone]    If any questions arise, call your doctor.  If your doctor is not available, please feel free to call the Surgical Center at [phone].     A registered nurse may call you a few days after your surgery to see how you are doing after your procedure.    MEDICATIONS: Resume taking daily medication.  Take prescribed pain medication with food.  If no medication is prescribed,  you may take non-aspirin pain medication if needed.  PAIN MEDICATION CAN BE VERY CONSTIPATING.  Take a stool softener or laxative such as senokot, pericolace, or milk of magnesia if needed.    If your physician has prescribed pain medication that includes Acetaminophen (Tylenol), do not take additional Acetaminophen (Tylenol) while taking the prescribed medication.  Laparoscopic Appendectomy Discharge Instructions:    ACTIVITIES:   You have a 10 pound weight restriction for two weeks after surgery.  This means no lifting anything heavier than a gallon of milk.  Routine activities such as bathing, walking, going up and down stairs, and driving* (see below) are safe.  Avoid strenuous activities and exercise that involves twisting, bending, and, running.    DRIVING:   You may drive whenever you are off pain medications and are able to perform the activities needed to drive, i.e. turning, bending, twisting, wearing a seat belt, etc.    BATHING:   You may get the wound wet at any time after leaving the hospital. You may shower, but do not submerge in a bath or a pool until you after your first postoperative visit.    WOUND CARE:   It is normal to have tenderness, discomfort or mild swelling at the site of the incisions.  If you have wound dressings, they may come off after 48 hours. If you have skin glue to the wound, this will fall off on its own, do not pick at it. If you have steri strips to the wound, these will fall off on their own, do not pick at them, may trim the edges if needed.    Avoid getting lotions, powders or deodorant on the incision while it is healing. Don't worry if the area under either incision feels firm or hard. This is normal and usually softens within a few months.    BOWEL FUNCTION:  A few patients, after this operation, will develop either frequent or loose stools after meals. This usually corrects itself after a few days, to a few weeks. Much more common than loose stools, is constipation.  The combination of pain medication and decreased activity level can cause constipation in otherwise normal patients. If you feel this is occurring, take an over the counter laxative (Milk of Magnesia, Ex-Lax, Senokot, Miralax, Magnesium Citrate, etc.) until the problem has resolved.  It also helps to stay regular by including fiber in your diet (for example: bran or fruits and vegetables) and drink plenty of liquids (water, juice, etc.).

## 2025-05-24 NOTE — OP REPORT
DATE OF SERVICE:  05/23/2025     SURGEON:  Nahun Nice MD     PROCEDURE:  Laparoscopic appendectomy.     ANESTHESIA:  General.     ESTIMATED BLOOD LOSS:  Less than 2 mL.     PREOPERATIVE DIAGNOSIS:  Acute appendicitis.     POSTOPERATIVE DIAGNOSIS:  Acute appendicitis.     HISTORY:  The patient is a 29-year-old male.  He has had abdominal pain since   6 a.m. and a CT scan showed evidence for acute appendicitis.  He was taken to   the operating room for correction.     DESCRIPTION OF PROCEDURE:  The patient was taken to the operating room.    Satisfactory general anesthesia was induced via endotracheal intubation.  The   patient was prepped and draped.  A timeout was then performed.     Local was instilled in the infraumbilical position.  A small transverse   incision was made.  Veress needle was used for insufflation and a 5 mm port   inserted here with a 10 mm suprapubic port and a 5 mm port in the right lower   quadrant.     The patient was positioned appropriately.  An acutely inflamed nonperforated   appendix was identified.  The mesoappendix was taken out with the Harmonic   scalpel and the base of the appendix stapled over with the specimen placed in   an EndoCatch bag.     The right lower quadrant was irrigated, fulgurated, and suck dry with Surgicel   powder placed in the appendiceal fossa.     All ports including the specimen were removed and the midline closed with #0   Vicryl.  The skin was closed with Monocryl and Dermabond.     Dressings were applied and the patient was sent to the recovery room in good   condition.     Specimens sent to pathology was labeled appendix.        ______________________________  MD CASSIUS RAMOS/ABDULLAHI    DD:  05/23/2025 16:51  DT:  05/23/2025 17:09    Job#:  [id number]

## 2025-05-24 NOTE — ANESTHESIA POSTPROCEDURE EVALUATION
Patient: Corbin Jamil    Procedure Summary       Date: 05/23/25 Room / Location:  OR 03 / SURGERY [facility]    Anesthesia Start: 1618 Anesthesia Stop: 1656    Procedure: APPENDECTOMY, LAPAROSCOPIC (Abdomen) Diagnosis: (Appendicitis)    Surgeons: Nahun Nice M.D. Responsible Provider: Krystyna Fine M.D.    Anesthesia Type: general ASA Status: 1            Final Anesthesia Type: general  Last vitals  BP   Blood Pressure: 109/72    Temp   37.1 °C (98.8 °F)    Pulse   78   Resp   16    SpO2   97 %      Anesthesia Post Evaluation    Patient location during evaluation: PACU  Patient participation: complete - patient participated  Level of consciousness: awake and alert    Airway patency: patent  Anesthetic complications: no  Cardiovascular status: hemodynamically stable  Respiratory status: acceptable  Hydration status: euvolemic    PONV: none          There were no known notable events for this encounter.     Nurse Pain Score: 4 (NPRS)

## 2025-05-24 NOTE — OR NURSING
1740- Patient arrived to phase 2. Patient changed out of surgical gown into clothes. Patient is A&Ox4. Patient reports tolerable pain and no nausea. Vital signs taken and patient assessed. Asked the patient if they had to use the bathroom. The patient declined.    1750- Discharge instructions read. All questions answered.    1802- IV removed. I transported the patient to her ride via wheelchair. Discharged to care of responsible adult. All belongings with the patient.

## 2025-06-09 ENCOUNTER — OFFICE VISIT (OUTPATIENT)
Dept: INTERNAL MEDICINE | Facility: OTHER | Age: 30
End: 2025-06-09
Payer: COMMERCIAL

## 2025-06-09 VITALS
HEIGHT: 69 IN | DIASTOLIC BLOOD PRESSURE: 65 MMHG | TEMPERATURE: 98.7 F | BODY MASS INDEX: 22.6 KG/M2 | SYSTOLIC BLOOD PRESSURE: 100 MMHG | WEIGHT: 152.6 LBS | OXYGEN SATURATION: 98 % | HEART RATE: 80 BPM

## 2025-06-09 DIAGNOSIS — Z90.49 S/P APPENDECTOMY: ICD-10-CM

## 2025-06-09 DIAGNOSIS — Z23 NEED FOR TDAP VACCINATION: ICD-10-CM

## 2025-06-09 DIAGNOSIS — R74.01 TRANSAMINITIS: Primary | ICD-10-CM

## 2025-06-09 DIAGNOSIS — Z76.89 ENCOUNTER TO ESTABLISH CARE: ICD-10-CM

## 2025-06-09 PROCEDURE — 3074F SYST BP LT 130 MM HG: CPT | Mod: GC

## 2025-06-09 PROCEDURE — 3078F DIAST BP <80 MM HG: CPT | Mod: GC

## 2025-06-09 PROCEDURE — 90471 IMMUNIZATION ADMIN: CPT

## 2025-06-09 PROCEDURE — 90715 TDAP VACCINE 7 YRS/> IM: CPT

## 2025-06-09 PROCEDURE — 99204 OFFICE O/P NEW MOD 45 MIN: CPT | Mod: 25,GC

## 2025-06-09 ASSESSMENT — PATIENT HEALTH QUESTIONNAIRE - PHQ9: CLINICAL INTERPRETATION OF PHQ2 SCORE: 0

## 2025-06-09 ASSESSMENT — FIBROSIS 4 INDEX: FIB4 SCORE: 0.88

## 2025-06-09 NOTE — PROGRESS NOTES
Teaching Physician Attestation      Level of Participation    I have personally interviewed and examined the patient.  In addition, I discussed with the resident physician the patient's history, exam, assessment and plan in detail.  Topics listed in my addendum were the focus of the visit.  Healthcare maintenance was not addressed this visit unless listed as a topic in my addendum.  I agree with the plan as written along with the following additions/modifications:    New Patient    PMH: low vit d?, s/p recent appendectomy, prior depression, prior viral meningitis, hx opiate use (hospitalization for fentanyl OD, also percocet - no IVDU, reports doing well with no cravings with no recent opiate use)    Mild transaminitis, etiology unclear  - Patient reports he believes has occurred previously also.  Mild, denies Tylenol use, or wild mushroom ingestion.  Denies history of IV drug use.  Per report no alcohol.  Will repeat CMP along with hepatitis serologies.  If negative likely would follow clinically, however if positive would pursue additional workup.  Of note, may benefit from at least right upper quadrant ultrasound given patient reports this is occurred previously, given fatty liver infiltration can occur even in patients with normal BMI (although uncommon)    Appreciate surgery support for appendicitis, defer further management    Check vit d given prior report of possible low vitamin D    tdap today    Return to clinic 6 weeks.  PCR

## 2025-06-09 NOTE — PROGRESS NOTES
NEW PATIENT  Chief Complaint   Patient presents with    New Patient     Establishing care- no concerns        History of Present Illness:   Corbin Jamil is a 29 y.o. male who PMH as below who presents to establish care and for:  1. Transaminitis    2. S/P appendectomy    3. Need for Tdap vaccination    4. Encounter to establish care      #Appendectomy  The patient reports having undergone an appendectomy on the 23rd of last month. They experienced pain and felt sick prior to the surgery. There are no complications reported post-surgery, and they have been cleared by their surgeon.    #Depression  The patient has a history of depression, which they feel is currently well-managed through lifestyle changes, therapy, and past medication use (SSRI). They report no current depressive symptoms or thoughts of self-harm. Transcranial magnetic stimulation (TMS) was also used and reported as helpful.    #Substance Use  The patient reports a history of a fentanyl overdose but denies current use of opioids or any other substances including tobacco, alcohol, and illicit drugs.    #Preventive Care and Immunizations  The patient believes they are up to date on vaccinations but notes they have not received a Tdap booster since 2008. They are open to receiving this vaccination today. The patient has not been screened for HIV, hepatitis C, or STIs, and is not sexually active.    Review of Systems:  Constitutional: Negative for chills, fever, and malaise/fatigue.  HEENT: Negative for congestion, nosebleeds, and sore throat.  Respiratory: Negative for cough, hemoptysis, sputum production, and shortness of breath.  Cardiovascular: Negative for chest pain, palpitations, and leg swelling.  Gastrointestinal: Negative for abdominal pain, blood in stool, constipation, diarrhea, melena, nausea, and vomiting.  Genitourinary: Negative for dysuria, frequency, hematuria, and urgency.  Musculoskeletal: Negative for falls and joint pain.  Skin:  "Negative for rash.  Neurological: Negative for dizziness, headaches, focal weakness, and loss of consciousness.  Psychiatric/Behavioral: Negative for depression and suicidal ideas.  All other systems reviewed and are negative.    Past Medical History:     Past Medical History[1]    There are no active problems to display for this patient.      Past Surgical History:   Past Surgical History[2]     Allergies:  Patient has no known allergies.    Medications:     Current Outpatient Medications:     VITAMIN D PO, 2 Tablet, Oral, DAILY, Taking     Social History:   Social History[3]    Tobacco/Drugs/Alcohol: as above  Occupation: [employer]    Family History:   Family History   Problem Relation Age of Onset    Heart Disease Maternal Grandfather     Diabetes Neg Hx     Stroke Neg Hx     Hypertension Neg Hx     Cancer Neg Hx      Objective:  Vitals:   /65 (BP Location: Left arm, Patient Position: Sitting, BP Cuff Size: Adult)   Pulse 80   Temp 37.1 °C (98.7 °F) (Temporal)   Ht 1.753 m (5' 9\")   Wt 69.2 kg (152 lb 9.6 oz)   SpO2 98%  Body mass index is 22.54 kg/m².    General: Alert and oriented, in no acute distress, generally well-appearing.  HEENT: Normocephalic, atraumatic, extraocular movement intact, external ears normal, norhinorrhea, moist mucous membranes, no pharyngeal edema or erythema.  Cardiovascular: Regular rate and rhythm, no murmurs or gallops.  Respiratory: No respiratory distress, no chest wall tenderness, breath sounds clear to auscultation bilaterally, without wheezing, rhonchi or rales.  Abdominal: Well healed abdominal incision scars. Soft, non-tender, non-distended, no organomegaly, normal bowel sounds.  Musculoskeletal: No deformity, swelling, or edema.  Skin: No lesion or rash.  Neurological: No focal deficits, no weakness, alert and oriented x4  Psychiatric: Appropriate mood and affect.    Results:   Latest Reference Range & Units 05/23/25 10:56   WBC 4.8 - 10.8 K/uL 18.3 (H)   RBC 4.70 - " 6.10 M/uL 5.57   Hemoglobin 14.0 - 18.0 g/dL 17.3   Hematocrit 42.0 - 52.0 % 48.9   MCV 81.4 - 97.8 fL 87.8   MCH 27.0 - 33.0 pg 31.1   MCHC 32.3 - 36.5 g/dL 35.4   RDW 35.9 - 50.0 fL 36.9   Platelet Count 164 - 446 K/uL 143 (L)   MPV 9.0 - 12.9 fL 11.4   Neutrophils-Polys 44.00 - 72.00 % 89.00 (H)   Neutrophils (Absolute) 1.82 - 7.42 K/uL 16.30 (H)   Lymphocytes 22.00 - 41.00 % 3.10 (L)   Lymphs (Absolute) 1.00 - 4.80 K/uL 0.56 (L)   Monocytes 0.00 - 13.40 % 7.10   Monos (Absolute) 0.00 - 0.85 K/uL 1.30 (H)   Eosinophils 0.00 - 6.90 % 0.00   Eos (Absolute) 0.00 - 0.51 K/uL 0.00   Basophils 0.00 - 1.80 % 0.20   Baso (Absolute) 0.00 - 0.12 K/uL 0.03   Immature Granulocytes 0.00 - 0.90 % 0.60   Immature Granulocytes (abs) 0.00 - 0.11 K/uL 0.11   Nucleated RBC 0.00 - 0.20 /100 WBC 0.00   NRBC (Absolute) K/uL 0.00   Sodium 135 - 145 mmol/L 139   Potassium 3.6 - 5.5 mmol/L 3.9   Chloride 96 - 112 mmol/L 103   Co2 20 - 33 mmol/L 21   Anion Gap 7.0 - 16.0  15.0   Glucose 65 - 99 mg/dL 116 (H)   Bun 8 - 22 mg/dL 19   Creatinine 0.50 - 1.40 mg/dL 0.88   GFR (CKD-EPI) >60 mL/min/1.73 m 2 119   Calcium 8.5 - 10.5 mg/dL 9.6   Correct Calcium 8.5 - 10.5 mg/dL 9.0   AST(SGOT) 12 - 45 U/L 43   ALT(SGPT) 2 - 50 U/L 98 (H)   Alkaline Phosphatase 30 - 99 U/L 132 (H)   Total Bilirubin 0.1 - 1.5 mg/dL 0.8   Albumin 3.2 - 4.9 g/dL 4.8   Total Protein 6.0 - 8.2 g/dL 7.5   Globulin 1.9 - 3.5 g/dL 2.7   A-G Ratio g/dL 1.8   Lipase 11 - 82 U/L 15   (H): Data is abnormally high  (L): Data is abnormally low    Assessment and Plan:    29 y.o. male with:     1. Transaminitis (Primary)  Per patient, historical prior to surgery  Denies alcohol use, denies IVDU  No prior hep screening  - Comp Metabolic Panel; Future  - HEPATITIS PANEL ACUTE(4 COMPONENTS); Future  - HIV AG/AB COMBO ASSAY SCREENING; Future    2. S/P appendectomy  No further followup with surgery  Asymptomatic, exam reassuring  - ER precautions discussed with patient    3. Need for  Tdap vaccination  - Tdap Vaccine =>6YO IM    4. Encounter to establish care  Hx of Vit D use during depression w/u  - VITAMIN D,25 HYDROXY (DEFICIENCY); Future    Preventative Healthcare:  Vaccinations -   Tdap given 6/9/25 in clinic  Need chickenpox and Hep A records  Cancer Screenings (colon, lung, breast, cervical) - n/a  Metabolic Screening - n/a  Infectious Disease Screening - pending  Substance Use - hx of percocet, fentanyl, now sober  Sexual Health - not active  Mental Health - hx of depression, resolved  Osteoporosis - n/a    Follow Up:  Return in about 5 weeks (around 7/14/2025) for Kassidy.    Wesley Yi MD  Internal Medicine PGY-2  [facility]         [1]   Past Medical History:  Diagnosis Date    Depression     Meningitis    [2]   Past Surgical History:  Procedure Laterality Date    ID LAP,APPENDECTOMY  5/23/2025    Procedure: APPENDECTOMY, LAPAROSCOPIC;  Surgeon: Nahun Nice M.D.;  Location: SURGERY [facility];  Service: General   [3]   Social History  Tobacco Use    Smoking status: Never    Smokeless tobacco: Never   Vaping Use    Vaping status: Never Used   Substance Use Topics    Alcohol use: No    Drug use: No

## 2025-06-09 NOTE — PATIENT INSTRUCTIONS
"Thank you for visiting our office. As discussed, here is the plan to address your health issues.     PLAN:  - Go to lab at least 5 days prior to your next visit to get bloodwork completed (if \"fasting labs\" are ordered, don't eat the day of your blood draw)   - Make a follow-up appointment with our office in 5 weeks    Please try and eat healthy, get at least 30 minutes of cardiovascular exercise a day to help keep your health as best as it can be. If you feel like you are experiencing a medical emergency please seek immediate medical attention at an urgent care or in the emergency department.    Wesley Yi MD    "

## 2025-07-14 ENCOUNTER — OFFICE VISIT (OUTPATIENT)
Dept: INTERNAL MEDICINE | Facility: OTHER | Age: 30
End: 2025-07-14
Payer: COMMERCIAL

## 2025-07-14 VITALS
OXYGEN SATURATION: 96 % | HEART RATE: 74 BPM | HEIGHT: 69 IN | SYSTOLIC BLOOD PRESSURE: 113 MMHG | TEMPERATURE: 98.6 F | BODY MASS INDEX: 22.87 KG/M2 | WEIGHT: 154.4 LBS | DIASTOLIC BLOOD PRESSURE: 78 MMHG

## 2025-07-14 DIAGNOSIS — L50.9 LOCALIZED HIVES: ICD-10-CM

## 2025-07-14 DIAGNOSIS — R74.01 TRANSAMINITIS: Primary | ICD-10-CM

## 2025-07-14 DIAGNOSIS — Z11.9 ENCOUNTER FOR SCREENING EXAMINATION FOR INFECTIOUS DISEASE: ICD-10-CM

## 2025-07-14 PROBLEM — Z90.49 S/P APPENDECTOMY: Status: ACTIVE | Noted: 2025-07-14

## 2025-07-14 PROBLEM — F32.A DEPRESSION: Status: ACTIVE | Noted: 2025-07-14

## 2025-07-14 PROBLEM — Z91.89 HISTORY OF DRUG OVERDOSE: Status: ACTIVE | Noted: 2025-07-14

## 2025-07-14 PROCEDURE — 3074F SYST BP LT 130 MM HG: CPT

## 2025-07-14 PROCEDURE — 99213 OFFICE O/P EST LOW 20 MIN: CPT | Mod: GE

## 2025-07-14 PROCEDURE — 3078F DIAST BP <80 MM HG: CPT

## 2025-07-14 ASSESSMENT — FIBROSIS 4 INDEX: FIB4 SCORE: 0.88

## 2025-07-14 NOTE — PROGRESS NOTES
Last Seen: 6/9/2025    Patient Care Team:  Wesley Yi M.D. as PCP - General (Internal Medicine)    Chief Complaint   Patient presents with    Follow-Up     5wk follow up- no new concerns      History of Present Illness:   Corbin Jamil is a 29 y.o. male with PMH as below who presents for:   1. Transaminitis    2. Encounter for screening examination for infectious disease    3. Localized hives      Since last visit, patient has no new complaints outside of hives as discussed below, he was unable to get lab work completed but plans to do so this week.    #Hives  The patient reports experiencing new onset hives over the past month, occurring multiple times a week. The hives appear on the arms and face and resolve within a day or two. The patient describes the hives as itchy but reports no associated swelling, throat closing, or difficulty breathing. There is no history of allergy or immune problems. The patient has not attempted any treatments for the hives yet.    Review of Systems:  Constitutional: Negative for chills, fever, and malaise/fatigue.  HEENT: Negative for congestion, nosebleeds, and sore throat.  Respiratory: Negative for cough, hemoptysis, sputum production, and shortness of breath.  Cardiovascular: Negative for chest pain, palpitations, and leg swelling.  Gastrointestinal: Negative for abdominal pain, blood in stool, constipation, diarrhea, melena, nausea, and vomiting.  Genitourinary: Negative for dysuria, frequency, hematuria, and urgency.  Musculoskeletal: Negative for falls and joint pain.  Skin: Positive for hives. Negative for rash elsewhere.  Neurological: Negative for dizziness, headaches, focal weakness, and loss of consciousness.  Psychiatric/Behavioral: Negative for depression and suicidal ideas.  All other systems reviewed and are negative.    Past Medical History:   Past Medical History[1]  Problem List[2]    Medications:   No current outpatient medications on file.     Social  "History:  Social History[3]    Objective:  Vitals:   /78 (BP Location: Left arm, Patient Position: Sitting, BP Cuff Size: Adult)   Pulse 74   Temp 37 °C (98.6 °F) (Temporal)   Ht 1.753 m (5' 9\")   Wt 70 kg (154 lb 6.4 oz)   SpO2 96%  Body mass index is 22.8 kg/m².    Physical Exam:  General: Alert and oriented, in no acute distress, generally well-appearing.  HEENT: Normocephalic, atraumatic, extraocular movement intact, external ears normal, norhinorrhea, moist mucous membranes, no pharyngeal edema or erythema.  Cardiovascular: Regular rate and rhythm, no murmurs or gallops.  Respiratory: No respiratory distress, no chest wall tenderness, breath sounds clear to auscultation bilaterally, without wheezing, rhonchi or rales.  Abdominal: Soft, some tenderness noted possibly related to previous surgery, non-distended, no organomegaly, normal bowel sounds.  Musculoskeletal: No deformity, swelling, or edema.  Skin: No active lesions or rash noted during examination.  Neurological: No focal deficits, no weakness, alert and oriented x4.  Psychiatric: Appropriate mood and affect.    Assessment and Plan:    29 y.o. male with:     1. Transaminitis (Primary)  2. Encounter for screening examination for infectious disease  Per patient, historical prior to surgery  Denies alcohol use, denies IVDU  No prior hep screening  - Pending CMP, hepatitis panel, HIV    3. Localized hives  No clear association with allergen, no prior history of hives  Mild, itchy, resolved after 1 to 2 days, localized to arms and occasionally face  - Start low-dose over-the-counter antihistamine  - Keep food journal/allergen journal  - Consider allergy referral at follow-up visit pending results    Preventative Healthcare:  Vaccinations -   Tdap given 6/9/25 in clinic  Need chickenpox and Hep A records  Cancer Screenings (colon, lung, breast, cervical) - n/a  Metabolic Screening - n/a  Infectious Disease Screening - pending  Substance Use - hx of " percocet, fentanyl, now sober  Sexual Health - not active  Mental Health - hx of depression, resolved  Osteoporosis - n/a    Follow Up:  Return in about 1 week (around 7/21/2025) for Dr. Yi on Fri, or any available next wk.  - review labwork and hives symptoms    Wesley Yi MD  Internal Medicine PGY-2  [facility]         [1]   Past Medical History:  Diagnosis Date    Depression     Meningitis    [2]   Patient Active Problem List  Diagnosis    S/P appendectomy    Depression    History of drug overdose    Transaminitis    Localized hives   [3]   Social History  Tobacco Use    Smoking status: Never    Smokeless tobacco: Never   Vaping Use    Vaping status: Never Used   Substance Use Topics    Alcohol use: No    Drug use: No

## 2025-07-14 NOTE — PATIENT INSTRUCTIONS
"Thank you for visiting our office. As discussed, here is the plan to address your health issues.     PLAN:  - Try an OTC antihistamine such as loratidine (or claritin)  - Keep a food diary (write down what you ate every time you get hives)  - Bloodwork completed (if \"fasting labs\" are ordered, don't eat the day of your blood draw)   - Make a follow-up appointment with our office in 5 wks    Please try and eat healthy, get at least 30 minutes of cardiovascular exercise a day to help keep your health as best as it can be. If you feel like you are experiencing a medical emergency please seek immediate medical attention at an urgent care or in the emergency department.    Wesley Yi MD    "

## 2025-07-25 ENCOUNTER — OFFICE VISIT (OUTPATIENT)
Dept: INTERNAL MEDICINE | Facility: OTHER | Age: 30
End: 2025-07-25
Payer: COMMERCIAL

## 2025-07-25 VITALS
HEART RATE: 78 BPM | SYSTOLIC BLOOD PRESSURE: 118 MMHG | BODY MASS INDEX: 23.13 KG/M2 | TEMPERATURE: 99.1 F | OXYGEN SATURATION: 98 % | WEIGHT: 156.2 LBS | DIASTOLIC BLOOD PRESSURE: 77 MMHG | HEIGHT: 69 IN

## 2025-07-25 DIAGNOSIS — M54.50 ACUTE MIDLINE LOW BACK PAIN WITHOUT SCIATICA: Primary | ICD-10-CM

## 2025-07-25 DIAGNOSIS — L50.9 LOCALIZED HIVES: ICD-10-CM

## 2025-07-25 DIAGNOSIS — R74.01 TRANSAMINITIS: ICD-10-CM

## 2025-07-25 ASSESSMENT — FIBROSIS 4 INDEX: FIB4 SCORE: 0.72

## 2025-07-25 NOTE — PROGRESS NOTES
Reason for visit:  Lower back pain  Check previous week's lab results      History of Present Illness:     Corbin Jamil is a 29 y.o. male who presents today with lower back pain and to find out about his lab results.  The pain started 3 days back when the patient wa trying to reach for something on the ground. There is midline localised tenderness over the lower back. There is no pain on palpation on the adjoining muscles.  The is pain over that point when the patient bends down or moves laterally. There is no redness over that particular area. The pain is being partially managed with advil.    The patient had hives over the past month, on the arms and face, occurring multiple times a week , which resolved within a day. The patient did not have any triggers for it. There are no other severe symptoms related to it. The patient says that there was no trigger, nor any exacerbating or relieving factors.The symptoms have now resolved significantly over the past week without any medications.     ROS     Constitutional: No chills or fever.  HENT: No ear pain, hearing loss, or tinnitus.  Eyes: No blurred vision or double vision.  Respiratory: No cough, hemoptysis, sputum production, or shortness of breath.  Cardiovascular: Negative for chest pain, palpitations, or leg swelling.  Gastrointestinal: No nausea, blood in stool, constipation, diarrhea, melena, or vomiting.  Genitourinary: No dysuria and urgency.  Skin: No itching.  Psychiatric/Behavioral: No depression or suicidal ideas.    Past Medical History:   Past Medical History[1]    Patient Active Problem List    Diagnosis Date Noted    S/P appendectomy 07/14/2025    Depression 07/14/2025    History of drug overdose 07/14/2025    Transaminitis 07/14/2025    Localized hives 07/14/2025       Past Surgical History[2]     Allergies:  Patient has no known allergies.    Medications:   No current outpatient medications on file.     Social History:   Social  "History[3]    Family History:   Family History   Problem Relation Age of Onset    Heart Disease Maternal Grandfather     Diabetes Neg Hx     Stroke Neg Hx     Hypertension Neg Hx     Cancer Neg Hx        Vitals:   /77 (BP Location: Left arm, Patient Position: Sitting, BP Cuff Size: Adult)   Pulse 78   Temp 37.3 °C (99.1 °F) (Temporal)   Ht 1.753 m (5' 9\")   Wt 70.9 kg (156 lb 3.2 oz)   SpO2 98%  Body mass index is 23.07 kg/m².    Physical Exam     Constitutional:       General:   The patient is in no acute distress.     Appearance: Normal appearance. He is      weight. He is not ill-appearing, toxic-appearing or diaphoretic.   HENT:      Head: Normocephalic and atraumatic.      Mouth/Throat:      Mouth: Mucous membranes are moist.      Pharynx: Oropharynx is clear. No oropharyngeal exudate or posterior oropharyngeal erythema.   Eyes:      General: No scleral icterus.        Right eye: No discharge.         Left eye: No discharge.      Conjunctiva/sclera: Normal conjunctivae.   Cardiovascular:      Rate and Rhythm: Normal rate and regular rhythm.      Pulses: Normal pulses.      Heart sounds: Normal heart sounds. No murmur heard.     No friction rub. No gallop.   Pulmonary:      Effort: Pulmonary effort is normal. No respiratory distress.      Breath sounds: Normal breath sounds. No stridor. No wheezing or rhonchi.   Abdominal:      General: Abdomen is flat. Bowel sounds are normal. There is no distension.      Palpations: Abdomen is soft. There are no masses.      Tenderness: There is no abdominal tenderness.      Hernia: No hernia is present.   Musculoskeletal:         General: No swelling or tenderness.      Right lower leg: No edema.      Left lower leg: No edema.    Skin:     General: Skin is warm and moist.      Coloration: Skin is not pale.      Findings: No erythema or rash.   Neurological:      General: No focal deficit present.      Mental Status: He is alert and oriented to person, place, and " time. Mental status is at baseline    Labs:     Imaging:     Assessment and Plan    1. Acute midline low back pain without sciatica (Primary)  - The patient has the midline back pain since 3 days. He has been managing the pain with 2 tablets of Advil OTC.  The patient was counceled regarding stretching exercises to reduce the pain, as well as heat pads over the affected area.  He was also counceled that, as there are no red flag symptoms, these symptoms will likely resolve on their own.  The patient was also counceled that pain he can take upto 4 OTC Advil tabs a day to manage the pain.  The patient was told to make an appointment if the pain does not decrease.    2. Transaminitis  - The patient has had transaminitis especially since this year. The patient was counceled that fatty liver could be a cause for the transaminitis , but as he does not have any other symptoms, we will just monitor the enzymes for now.    3. Localized hives  - The patient had hives for the past month, but the symptoms have decreased significantly over the past week.  -The patient was counceled that he could take low dose antihistamines for the symptoms if they arise.            Follow up in 6 months to check transaminitis enzyme levels.    Signed by:    Dr. Leonardo Riddle    PGY-1 Internal Medicine Resident            [1]   Past Medical History:  Diagnosis Date    Depression     Meningitis    [2]   Past Surgical History:  Procedure Laterality Date    RI LAP,APPENDECTOMY  5/23/2025    Procedure: APPENDECTOMY, LAPAROSCOPIC;  Surgeon: Nahun Nice M.D.;  Location: SURGERY [facility];  Service: General   [3]   Social History  Tobacco Use    Smoking status: Never    Smokeless tobacco: Never   Vaping Use    Vaping status: Never Used   Substance Use Topics    Alcohol use: No    Drug use: No

## 2025-08-05 ENCOUNTER — HOSPITAL ENCOUNTER (EMERGENCY)
Facility: MEDICAL CENTER | Age: 30
End: 2025-08-05
Attending: EMERGENCY MEDICINE
Payer: COMMERCIAL

## 2025-08-05 VITALS
DIASTOLIC BLOOD PRESSURE: 79 MMHG | HEART RATE: 76 BPM | WEIGHT: 150 LBS | HEIGHT: 68 IN | BODY MASS INDEX: 22.73 KG/M2 | SYSTOLIC BLOOD PRESSURE: 130 MMHG | RESPIRATION RATE: 12 BRPM | OXYGEN SATURATION: 100 % | TEMPERATURE: 97.7 F

## 2025-08-05 DIAGNOSIS — T14.91XA SUICIDE ATTEMPT (HCC): ICD-10-CM

## 2025-08-05 DIAGNOSIS — R45.851 SUICIDAL IDEATION: Primary | ICD-10-CM

## 2025-08-05 LAB
ALBUMIN SERPL BCP-MCNC: 4.4 G/DL (ref 3.2–4.9)
ALBUMIN/GLOB SERPL: 2 G/DL
ALP SERPL-CCNC: 123 U/L (ref 30–99)
ALT SERPL-CCNC: 34 U/L (ref 2–50)
AMPHET UR QL SCN: NEGATIVE
ANION GAP SERPL CALC-SCNC: 12 MMOL/L (ref 7–16)
APAP SERPL-MCNC: 13.6 UG/ML (ref 10–30)
AST SERPL-CCNC: 18 U/L (ref 12–45)
BARBITURATES UR QL SCN: NEGATIVE
BASOPHILS # BLD AUTO: 0.2 % (ref 0–1.8)
BASOPHILS # BLD: 0.03 K/UL (ref 0–0.12)
BENZODIAZ UR QL SCN: NEGATIVE
BILIRUB SERPL-MCNC: 0.8 MG/DL (ref 0.1–1.5)
BUN SERPL-MCNC: 18 MG/DL (ref 8–22)
BZE UR QL SCN: NEGATIVE
CALCIUM ALBUM COR SERPL-MCNC: 8.1 MG/DL (ref 8.5–10.5)
CALCIUM SERPL-MCNC: 8.4 MG/DL (ref 8.5–10.5)
CANNABINOIDS UR QL SCN: NEGATIVE
CHLORIDE SERPL-SCNC: 106 MMOL/L (ref 96–112)
CO2 SERPL-SCNC: 21 MMOL/L (ref 20–33)
CREAT SERPL-MCNC: 0.85 MG/DL (ref 0.5–1.4)
EKG IMPRESSION: NORMAL
EOSINOPHIL # BLD AUTO: 0.03 K/UL (ref 0–0.51)
EOSINOPHIL NFR BLD: 0.2 % (ref 0–6.9)
ERYTHROCYTE [DISTWIDTH] IN BLOOD BY AUTOMATED COUNT: 36.6 FL (ref 35.9–50)
FENTANYL UR QL: NEGATIVE
GFR SERPLBLD CREATININE-BSD FMLA CKD-EPI: 120 ML/MIN/1.73 M 2
GLOBULIN SER CALC-MCNC: 2.2 G/DL (ref 1.9–3.5)
GLUCOSE SERPL-MCNC: 107 MG/DL (ref 65–99)
HCT VFR BLD AUTO: 44.7 % (ref 42–52)
HGB BLD-MCNC: 15.9 G/DL (ref 14–18)
IMM GRANULOCYTES # BLD AUTO: 0.05 K/UL (ref 0–0.11)
IMM GRANULOCYTES NFR BLD AUTO: 0.4 % (ref 0–0.9)
LYMPHOCYTES # BLD AUTO: 0.94 K/UL (ref 1–4.8)
LYMPHOCYTES NFR BLD: 6.9 % (ref 22–41)
MCH RBC QN AUTO: 31 PG (ref 27–33)
MCHC RBC AUTO-ENTMCNC: 35.6 G/DL (ref 32.3–36.5)
MCV RBC AUTO: 87.1 FL (ref 81.4–97.8)
METHADONE UR QL SCN: NEGATIVE
MONOCYTES # BLD AUTO: 0.72 K/UL (ref 0–0.85)
MONOCYTES NFR BLD AUTO: 5.3 % (ref 0–13.4)
NEUTROPHILS # BLD AUTO: 11.91 K/UL (ref 1.82–7.42)
NEUTROPHILS NFR BLD: 87 % (ref 44–72)
NRBC # BLD AUTO: 0 K/UL
NRBC BLD-RTO: 0 /100 WBC (ref 0–0.2)
OPIATES UR QL SCN: NEGATIVE
OXYCODONE UR QL SCN: POSITIVE
PCP UR QL SCN: NEGATIVE
PLATELET # BLD AUTO: 146 K/UL (ref 164–446)
PMV BLD AUTO: 11.1 FL (ref 9–12.9)
POC BREATHALIZER: 0 PERCENT (ref 0–0.01)
POTASSIUM SERPL-SCNC: 3.7 MMOL/L (ref 3.6–5.5)
PROPOXYPH UR QL SCN: NEGATIVE
PROT SERPL-MCNC: 6.6 G/DL (ref 6–8.2)
RBC # BLD AUTO: 5.13 M/UL (ref 4.7–6.1)
SALICYLATES SERPL-MCNC: <1 MG/DL (ref 15–25)
SODIUM SERPL-SCNC: 139 MMOL/L (ref 135–145)
WBC # BLD AUTO: 13.7 K/UL (ref 4.8–10.8)

## 2025-08-05 PROCEDURE — 80307 DRUG TEST PRSMV CHEM ANLYZR: CPT

## 2025-08-05 PROCEDURE — 80179 DRUG ASSAY SALICYLATE: CPT

## 2025-08-05 PROCEDURE — 96374 THER/PROPH/DIAG INJ IV PUSH: CPT

## 2025-08-05 PROCEDURE — 700111 HCHG RX REV CODE 636 W/ 250 OVERRIDE (IP): Mod: JZ,UD | Performed by: EMERGENCY MEDICINE

## 2025-08-05 PROCEDURE — 302970 POC BREATHALIZER: Performed by: EMERGENCY MEDICINE

## 2025-08-05 PROCEDURE — 99285 EMERGENCY DEPT VISIT HI MDM: CPT

## 2025-08-05 PROCEDURE — 80053 COMPREHEN METABOLIC PANEL: CPT

## 2025-08-05 PROCEDURE — 85025 COMPLETE CBC W/AUTO DIFF WBC: CPT

## 2025-08-05 PROCEDURE — 93005 ELECTROCARDIOGRAM TRACING: CPT | Mod: TC | Performed by: EMERGENCY MEDICINE

## 2025-08-05 PROCEDURE — 36415 COLL VENOUS BLD VENIPUNCTURE: CPT

## 2025-08-05 PROCEDURE — 80143 DRUG ASSAY ACETAMINOPHEN: CPT

## 2025-08-05 PROCEDURE — 90791 PSYCH DIAGNOSTIC EVALUATION: CPT

## 2025-08-05 RX ORDER — IBUPROFEN 200 MG
800 TABLET ORAL EVERY 8 HOURS PRN
COMMUNITY

## 2025-08-05 RX ORDER — ONDANSETRON 2 MG/ML
4 INJECTION INTRAMUSCULAR; INTRAVENOUS ONCE
Status: COMPLETED | OUTPATIENT
Start: 2025-08-05 | End: 2025-08-05

## 2025-08-05 RX ADMIN — ONDANSETRON 4 MG: 2 INJECTION INTRAMUSCULAR; INTRAVENOUS at 13:45

## 2025-08-05 ASSESSMENT — FIBROSIS 4 INDEX: FIB4 SCORE: 0.72

## (undated) DEVICE — TUBE CONNECTING SUCTION - CLEAR PLASTIC STERILE 72 IN (50EA/CA)

## (undated) DEVICE — WATER IRRIGATION STERILE 1000ML (12EA/CA)

## (undated) DEVICE — HEMOSTAT ABSORBABLE POWDER SURGICEL 3G (5EA/BX)

## (undated) DEVICE — GLOVE BIOGEL SZ 8.5 SURGICAL PF LTX - (50PR/BX 4BX/CA)

## (undated) DEVICE — STAPLE 45MM BLUE 4.5MM (12EA/BX)

## (undated) DEVICE — SET SUCTION/IRRIGATION WITH DISPOSABLE TIP (6/CA )PART #0250-070-520 IS A SUB

## (undated) DEVICE — COVER LIGHT HANDLE FLEXIBLE - SOFT (2EA/PK 80PK/CA)

## (undated) DEVICE — SLEEVE, XCEL 5MM TROCAR

## (undated) DEVICE — SUTURE 4-0 MONOCRYL PLUS PS-1 - 27 INCH (36/BX)

## (undated) DEVICE — CANISTER SUCTION RIGID RED 1500CC (40EA/CA)

## (undated) DEVICE — TUBING INSUFFLATION - (10/BX)

## (undated) DEVICE — SEALER VESSEL HARMONIC ACE PLUS WITH ADVANCED HEMOSTASIS 36CM (1/EA)

## (undated) DEVICE — TROCAR Z THREAD 12 X 100 - BLADED (6/BX)

## (undated) DEVICE — SENSOR OXIMETER ADULT SPO2 RD SET (20EA/BX)

## (undated) DEVICE — SODIUM CHL IRRIGATION 0.9% 1000ML (12EA/CA)

## (undated) DEVICE — TROCAR 5X100 BLADED Z-THREAD - KII (6/BX)

## (undated) DEVICE — GLOVE SURGICAL PROTEXIS PI 8.0 LF - (50PR/BX)

## (undated) DEVICE — Device

## (undated) DEVICE — APPLICATOR ENDOSCOPIC SURGICEL (5EA/BX)

## (undated) DEVICE — GOWN WARMING STANDARD FLEX - (30/CA)

## (undated) DEVICE — HUMID-VENT HEAT AND MOISTURE EXCHANGE- (50/BX)

## (undated) DEVICE — ELECTRODE DUAL RETURN W/ CORD - (50/PK)

## (undated) DEVICE — ELECTRODE 5MM LHK LAPSCP STERILE DISP- MEGADYNE (5/CA)

## (undated) DEVICE — BAG SPONGE COUNT 10.25 X 32 - BLUE (250/CA)

## (undated) DEVICE — SUTURE GENERAL

## (undated) DEVICE — CANNULA W/SEAL12X100ZTHREAD - (12/BX)

## (undated) DEVICE — CHLORAPREP 26 ML APPLICATOR - ORANGE TINT(25/CA)

## (undated) DEVICE — SUTURE 0 VICRYL PLUS CT-2 - 27 INCH (36/BX)

## (undated) DEVICE — SUCTION INSTRUMENT YANKAUER BULBOUS TIP W/O VENT (50EA/CA)

## (undated) DEVICE — BAG RETRIEVAL 10ML (10EA/BX)